# Patient Record
Sex: MALE | Race: BLACK OR AFRICAN AMERICAN | Employment: FULL TIME | URBAN - METROPOLITAN AREA
[De-identification: names, ages, dates, MRNs, and addresses within clinical notes are randomized per-mention and may not be internally consistent; named-entity substitution may affect disease eponyms.]

---

## 2020-07-11 ENCOUNTER — APPOINTMENT (OUTPATIENT)
Dept: CT IMAGING | Age: 40
End: 2020-07-11
Payer: COMMERCIAL

## 2020-07-11 ENCOUNTER — APPOINTMENT (OUTPATIENT)
Dept: GENERAL RADIOLOGY | Age: 40
End: 2020-07-11
Payer: COMMERCIAL

## 2020-07-11 ENCOUNTER — HOSPITAL ENCOUNTER (OUTPATIENT)
Age: 40
Setting detail: OBSERVATION
Discharge: HOME | End: 2020-07-14
Attending: EMERGENCY MEDICINE | Admitting: HOSPITALIST
Payer: COMMERCIAL

## 2020-07-11 PROBLEM — G81.94 HEMIPARESIS OF LEFT NONDOMINANT SIDE (HCC): Status: ACTIVE | Noted: 2020-07-11

## 2020-07-11 PROBLEM — G81.90: Status: ACTIVE | Noted: 2020-07-11

## 2020-07-11 LAB
A/G RATIO: 1.3 (ref 1.1–2.2)
ALBUMIN SERPL-MCNC: 4.4 G/DL (ref 3.4–5)
ALP BLD-CCNC: 46 U/L (ref 40–129)
ALT SERPL-CCNC: 20 U/L (ref 10–40)
AMPHETAMINE SCREEN, URINE: NORMAL
ANION GAP SERPL CALCULATED.3IONS-SCNC: 10 MMOL/L (ref 3–16)
AST SERPL-CCNC: 18 U/L (ref 15–37)
BARBITURATE SCREEN URINE: NORMAL
BASOPHILS ABSOLUTE: 0 K/UL (ref 0–0.2)
BASOPHILS RELATIVE PERCENT: 0.4 %
BENZODIAZEPINE SCREEN, URINE: NORMAL
BILIRUB SERPL-MCNC: 0.6 MG/DL (ref 0–1)
BUN BLDV-MCNC: 10 MG/DL (ref 7–20)
CALCIUM SERPL-MCNC: 10.3 MG/DL (ref 8.3–10.6)
CANNABINOID SCREEN URINE: NORMAL
CHLORIDE BLD-SCNC: 102 MMOL/L (ref 99–110)
CO2: 30 MMOL/L (ref 21–32)
COCAINE METABOLITE SCREEN URINE: NORMAL
CREAT SERPL-MCNC: 1.2 MG/DL (ref 0.9–1.3)
EOSINOPHILS ABSOLUTE: 0.1 K/UL (ref 0–0.6)
EOSINOPHILS RELATIVE PERCENT: 1.6 %
ETHANOL: NORMAL MG/DL (ref 0–0.08)
GFR AFRICAN AMERICAN: >60
GFR NON-AFRICAN AMERICAN: >60
GLOBULIN: 3.5 G/DL
GLUCOSE BLD-MCNC: 74 MG/DL (ref 70–99)
GLUCOSE BLD-MCNC: 76 MG/DL (ref 70–99)
HCT VFR BLD CALC: 50.3 % (ref 40.5–52.5)
HEMOGLOBIN: 17.5 G/DL (ref 13.5–17.5)
LYMPHOCYTES ABSOLUTE: 3.1 K/UL (ref 1–5.1)
LYMPHOCYTES RELATIVE PERCENT: 48.4 %
Lab: NORMAL
MCH RBC QN AUTO: 27 PG (ref 26–34)
MCHC RBC AUTO-ENTMCNC: 34.8 G/DL (ref 31–36)
MCV RBC AUTO: 77.7 FL (ref 80–100)
METHADONE SCREEN, URINE: NORMAL
MONOCYTES ABSOLUTE: 0.6 K/UL (ref 0–1.3)
MONOCYTES RELATIVE PERCENT: 9.7 %
NEUTROPHILS ABSOLUTE: 2.5 K/UL (ref 1.7–7.7)
NEUTROPHILS RELATIVE PERCENT: 39.9 %
OPIATE SCREEN URINE: NORMAL
OXYCODONE URINE: NORMAL
PDW BLD-RTO: 13.6 % (ref 12.4–15.4)
PERFORMED ON: NORMAL
PH UA: 6
PHENCYCLIDINE SCREEN URINE: NORMAL
PLATELET # BLD: 283 K/UL (ref 135–450)
PMV BLD AUTO: 8 FL (ref 5–10.5)
POTASSIUM SERPL-SCNC: 4.7 MMOL/L (ref 3.5–5.1)
PROPOXYPHENE SCREEN: NORMAL
RBC # BLD: 6.47 M/UL (ref 4.2–5.9)
SODIUM BLD-SCNC: 142 MMOL/L (ref 136–145)
TOTAL PROTEIN: 7.9 G/DL (ref 6.4–8.2)
TROPONIN: <0.01 NG/ML
TROPONIN: <0.01 NG/ML
WBC # BLD: 6.3 K/UL (ref 4–11)

## 2020-07-11 PROCEDURE — 36415 COLL VENOUS BLD VENIPUNCTURE: CPT

## 2020-07-11 PROCEDURE — 6370000000 HC RX 637 (ALT 250 FOR IP): Performed by: HOSPITALIST

## 2020-07-11 PROCEDURE — 2580000003 HC RX 258: Performed by: HOSPITALIST

## 2020-07-11 PROCEDURE — 80053 COMPREHEN METABOLIC PANEL: CPT

## 2020-07-11 PROCEDURE — G0480 DRUG TEST DEF 1-7 CLASSES: HCPCS

## 2020-07-11 PROCEDURE — 93005 ELECTROCARDIOGRAM TRACING: CPT | Performed by: NURSE PRACTITIONER

## 2020-07-11 PROCEDURE — 80307 DRUG TEST PRSMV CHEM ANLYZR: CPT

## 2020-07-11 PROCEDURE — G0378 HOSPITAL OBSERVATION PER HR: HCPCS

## 2020-07-11 PROCEDURE — 71045 X-RAY EXAM CHEST 1 VIEW: CPT

## 2020-07-11 PROCEDURE — 85025 COMPLETE CBC W/AUTO DIFF WBC: CPT

## 2020-07-11 PROCEDURE — 70450 CT HEAD/BRAIN W/O DYE: CPT

## 2020-07-11 PROCEDURE — 84484 ASSAY OF TROPONIN QUANT: CPT

## 2020-07-11 PROCEDURE — 99285 EMERGENCY DEPT VISIT HI MDM: CPT

## 2020-07-11 RX ORDER — IBUPROFEN 200 MG
400 TABLET ORAL EVERY 6 HOURS PRN
COMMUNITY

## 2020-07-11 RX ORDER — LABETALOL HYDROCHLORIDE 5 MG/ML
10 INJECTION, SOLUTION INTRAVENOUS EVERY 10 MIN PRN
Status: DISCONTINUED | OUTPATIENT
Start: 2020-07-11 | End: 2020-07-14 | Stop reason: HOSPADM

## 2020-07-11 RX ORDER — ONDANSETRON 2 MG/ML
4 INJECTION INTRAMUSCULAR; INTRAVENOUS EVERY 6 HOURS PRN
Status: DISCONTINUED | OUTPATIENT
Start: 2020-07-11 | End: 2020-07-14 | Stop reason: HOSPADM

## 2020-07-11 RX ORDER — ACETAMINOPHEN 325 MG/1
650 TABLET ORAL EVERY 4 HOURS PRN
Status: DISCONTINUED | OUTPATIENT
Start: 2020-07-11 | End: 2020-07-14 | Stop reason: HOSPADM

## 2020-07-11 RX ORDER — POLYETHYLENE GLYCOL 3350 17 G/17G
17 POWDER, FOR SOLUTION ORAL DAILY PRN
Status: DISCONTINUED | OUTPATIENT
Start: 2020-07-11 | End: 2020-07-14 | Stop reason: HOSPADM

## 2020-07-11 RX ORDER — ONDANSETRON 4 MG/1
4 TABLET, ORALLY DISINTEGRATING ORAL EVERY 8 HOURS PRN
Status: DISCONTINUED | OUTPATIENT
Start: 2020-07-11 | End: 2020-07-14 | Stop reason: HOSPADM

## 2020-07-11 RX ORDER — SODIUM CHLORIDE 0.9 % (FLUSH) 0.9 %
10 SYRINGE (ML) INJECTION PRN
Status: DISCONTINUED | OUTPATIENT
Start: 2020-07-11 | End: 2020-07-14 | Stop reason: HOSPADM

## 2020-07-11 RX ORDER — ASPIRIN 81 MG/1
81 TABLET ORAL DAILY
Status: DISCONTINUED | OUTPATIENT
Start: 2020-07-12 | End: 2020-07-14 | Stop reason: HOSPADM

## 2020-07-11 RX ORDER — ATORVASTATIN CALCIUM 80 MG/1
80 TABLET, FILM COATED ORAL NIGHTLY
Status: DISCONTINUED | OUTPATIENT
Start: 2020-07-11 | End: 2020-07-14 | Stop reason: HOSPADM

## 2020-07-11 RX ORDER — ACETAMINOPHEN 650 MG/1
650 SUPPOSITORY RECTAL EVERY 4 HOURS PRN
Status: DISCONTINUED | OUTPATIENT
Start: 2020-07-11 | End: 2020-07-14 | Stop reason: HOSPADM

## 2020-07-11 RX ORDER — SODIUM CHLORIDE 0.9 % (FLUSH) 0.9 %
10 SYRINGE (ML) INJECTION EVERY 12 HOURS SCHEDULED
Status: DISCONTINUED | OUTPATIENT
Start: 2020-07-11 | End: 2020-07-14 | Stop reason: HOSPADM

## 2020-07-11 RX ORDER — ASPIRIN 300 MG/1
300 SUPPOSITORY RECTAL DAILY
Status: DISCONTINUED | OUTPATIENT
Start: 2020-07-12 | End: 2020-07-14 | Stop reason: HOSPADM

## 2020-07-11 RX ADMIN — ATORVASTATIN CALCIUM 80 MG: 80 TABLET, FILM COATED ORAL at 22:59

## 2020-07-11 RX ADMIN — ASPIRIN 325 MG: 325 TABLET, COATED ORAL at 22:59

## 2020-07-11 RX ADMIN — Medication 10 ML: at 22:59

## 2020-07-11 SDOH — HEALTH STABILITY: MENTAL HEALTH: HOW OFTEN DO YOU HAVE A DRINK CONTAINING ALCOHOL?: NEVER

## 2020-07-11 ASSESSMENT — ENCOUNTER SYMPTOMS
CHEST TIGHTNESS: 0
DIARRHEA: 0
ABDOMINAL PAIN: 0
NAUSEA: 0
SHORTNESS OF BREATH: 0
VOMITING: 0

## 2020-07-11 ASSESSMENT — PAIN DESCRIPTION - LOCATION
LOCATION: SHOULDER
LOCATION_2: OTHER (COMMENT)
LOCATION: ARM;LEG
LOCATION: ARM

## 2020-07-11 ASSESSMENT — PAIN SCALES - GENERAL
PAINLEVEL_OUTOF10: 7
PAINLEVEL_OUTOF10: 6
PAINLEVEL_OUTOF10: 6
PAINLEVEL_OUTOF10: 7

## 2020-07-11 ASSESSMENT — PAIN DESCRIPTION - PAIN TYPE
TYPE: ACUTE PAIN
TYPE: ACUTE PAIN
TYPE_2: CHRONIC PAIN

## 2020-07-11 ASSESSMENT — PAIN DESCRIPTION - INTENSITY: RATING_2: 6

## 2020-07-11 ASSESSMENT — PAIN DESCRIPTION - ORIENTATION
ORIENTATION_2: RIGHT
ORIENTATION: LEFT
ORIENTATION: LEFT

## 2020-07-11 ASSESSMENT — PAIN DESCRIPTION - DESCRIPTORS
DESCRIPTORS_2: ACHING
DESCRIPTORS: HEAVINESS

## 2020-07-11 NOTE — LETTER
[facility] [place], [place] [address]  Phone: [phone]            July 14, 2020     Patient: Kye Posada   YOB: 1980   Date of Visit: 7/11/2020       To Whom It May Concern:    Kye Posada was hospitalized Saturday 7/11 and discharged Tuesday 7/14. He may return to full duty Wednesday 7/15 with no restrictions. If you have any questions or concerns, please don't hesitate to call.     Sincerely,        Lenin Uribe, CORNELIO-CNP

## 2020-07-11 NOTE — ED NOTES
Pt reports left arm stiffness to left arm shoulder feels like arm is stiff       Emily Oakley, RN  07/11/20 3025

## 2020-07-11 NOTE — ED NOTES
Pt verified white label on lab tubes was him, correct name and date of birth     Jen Begum RN  07/11/20

## 2020-07-11 NOTE — ED PROVIDER NOTES
[facility]        Pt Name: Precious Herrera  MRN: [id number]  [place] 1980  Date of evaluation: 7/11/2020  Provider: CORNELIO Mcmillan CNP  PCP: No primary care provider on file. I have seen and evaluated this patient with my supervising physician Leigh Kim       Chief Complaint   Patient presents with    Extremity Weakness     Pt to er with c/o numbness to left arm adn left leg for a week, states went to urgent care today for eval. pt denies injury to arm or leg, pt able to walk and move extremities with no difficulty. HISTORY OF PRESENT ILLNESS   (Location, Timing/Onset, Context/Setting, Quality, Duration, Modifying Factors, Severity, Associated Signs and Symptoms)  Note limiting factors. Precious Herrera is a 38 y.o. male presents to the emergency department complaining of paresthesia to left arm and left leg, states that they feel heavy. Reports mild left-sided headache. States that symptoms have been ongoing and constant for more than 1 week. He did go to urgent care today and was sent to the emergency department for formal evaluation. Denies chronic medical problems. Denies any fever, lightheadedness, dizziness, visual disturbances. No chest pain or pressure. No neck or back pain. No shortness of breath, cough, or congestion. No abdominal pain, nausea, vomiting, diarrhea, constipation, or dysuria. No rash. Nursing Notes were all reviewed and agreed with or any disagreements were addressed in the HPI. REVIEW OF SYSTEMS    (2-9 systems for level 4, 10 or more for level 5)     Review of Systems   Constitutional: Negative for activity change, chills and fever. Respiratory: Negative for chest tightness and shortness of breath. Cardiovascular: Negative for chest pain. Gastrointestinal: Negative for abdominal pain, diarrhea, nausea and vomiting.    Genitourinary: Negative for dysuria. Neurological: Positive for weakness and numbness. All other systems reviewed and are negative. Positives and Pertinent negatives as per HPI. Except as noted above in the ROS, all other systems were reviewed and negative. PAST MEDICAL HISTORY   No past medical history on file. SURGICAL HISTORY   No past surgical history on file. CURRENTMEDICATIONS       Previous Medications    No medications on file         ALLERGIES     Patient has no known allergies. FAMILYHISTORY     No family history on file. SOCIAL HISTORY       Social History     Tobacco Use    Smoking status: Never Smoker    Smokeless tobacco: Never Used   Substance Use Topics    Alcohol use: Never     Frequency: Never    Drug use: Not on file       SCREENINGS   NIH Stroke Scale  Interval: Baseline  Level of Consciousness (1a. ): Alert  LOC Questions (1b. ): Answers both correctly  LOC Commands (1c. ): Performs both tasks correctly  Best Gaze (2. ): Normal  Visual (3. ): No visual loss  Facial Palsy (4. ): Normal symmetrical movement  Motor Arm, Left (5a. ): Drift, but does not hit bed  Motor Arm, Right (5b. ): No drift  Motor Leg, Left (6a. ): Drift, but does not hit bed  Motor Leg, Right (6b. ): No drift  Limb Ataxia (7. ): Absent  Sensory (8. ): Normal(pt states if you rub the left arm he has decrease sensation on left arm but if you touch left arm sensation is equal)  Best Language (9. ): No aphasia  Dysarthria (10. ): Normal  Extinction and Inattention (11): No abnormality  Total: 2         PHYSICAL EXAM    (up to 7 for level 4, 8 or more for level 5)     ED Triage Vitals [07/11/20 1331]   BP Temp Temp Source Pulse Resp SpO2 Height Weight   108/76 98.2 °F (36.8 °C) Oral 79 17 99 % 5' 9\" (1.753 m) 175 lb (79.4 kg)       Physical Exam  Vitals signs and nursing note reviewed. Constitutional:       Appearance: He is well-developed. He is not diaphoretic.    HENT:      Head: Normocephalic and atraumatic. Right Ear: External ear normal.      Left Ear: External ear normal.      Mouth/Throat:      Pharynx: Oropharynx is clear. Eyes:      General:         Right eye: No discharge. Left eye: No discharge. Extraocular Movements: Extraocular movements intact. Conjunctiva/sclera: Conjunctivae normal.   Neck:      Musculoskeletal: Normal range of motion and neck supple. Vascular: No JVD. Cardiovascular:      Rate and Rhythm: Normal rate and regular rhythm. Pulses: Normal pulses. Heart sounds: Normal heart sounds. Pulmonary:      Effort: Pulmonary effort is normal. No respiratory distress. Breath sounds: Normal breath sounds. Abdominal:      Palpations: Abdomen is soft. Musculoskeletal: Normal range of motion. Skin:     General: Skin is warm and dry. Coloration: Skin is not pale. Neurological:      Mental Status: He is alert and oriented to person, place, and time. Cranial Nerves: Cranial nerves are intact. Sensory: Sensation is intact. Motor: Pronator drift present. No tremor. Coordination: Coordination is intact. Romberg sign negative. Coordination normal. Finger-Nose-Finger Test and Heel to [facility] Test normal.      Gait: Gait is intact. Comments: No paresthesia with exam    Slight pronator drift to upper and lower left extremities.     5/5 strength upper and lower extremities   Psychiatric:         Behavior: Behavior normal.         DIAGNOSTIC RESULTS   LABS:    Labs Reviewed   CBC WITH AUTO DIFFERENTIAL - Abnormal; Notable for the following components:       Result Value    RBC 6.47 (*)     MCV 77.7 (*)     All other components within normal limits    Narrative:     Performed at:  [facility] [address], [facility], [address]   Phone [phone]   COMPREHENSIVE METABOLIC PANEL    Narrative:     Performed at:  [facility] [address], [facility], [place] [address]   Phone [phone]   TROPONIN    Narrative:     Performed at:  [facility]  [address] [facility],  [place], [address]   Phone [phone]   URINE DRUG SCREEN    Narrative:     Performed at:  [facility]  [address] [facility],  [place], [address]   Phone [phone]   ETHANOL    Narrative:     Performed at:  [facility]  [address] [facility],  [place], [address]   Phone [phone]   POCT GLUCOSE    Narrative:     Performed at:  [facility]  [address] [facility],  [place], [address]   Phone [phone]       All other labs were within normal range or not returned as of this dictation. EKG: All EKG's are interpreted by the Emergency Department Physician in the absence of a cardiologist.  Please see their note for interpretation of EKG. RADIOLOGY:   Non-plain film images such as CT, Ultrasound and MRI are read by the radiologist. Plain radiographic images are visualized and preliminarily interpreted by the ED Provider with the below findings:        Interpretation per the Radiologist below, if available at the time of this note:    XR CHEST PORTABLE   Final Result   No acute cardiopulmonary pathology. CT Head WO Contrast   Final Result   No acute intracranial abnormality. No results found. PROCEDURES   Unless otherwise noted below, none     Procedures    CRITICAL CARE TIME   The total critical care time spent while evaluating and treating this patient was at least 31 minutes. This excludes time spent doing separately billable procedures. This includes time at the bedside, data interpretation, medication management, obtaining critical history from collateral sources if the patient is unable to provide it directly, and physician consultation.   Specifics of interventions taken and potentially life-threatening diagnostic considerations are listed above in the medical decision making. CONSULTS:  None      EMERGENCY DEPARTMENT COURSE and DIFFERENTIAL DIAGNOSIS/MDM:   Vitals:    Vitals:    07/11/20 1422 07/11/20 1500 07/11/20 1600 07/11/20 1621   BP: 139/83 132/87 (!) 149/84 136/83   Pulse: 75 79 80 80   Resp: 12 14 10 12   Temp:       TempSrc:       SpO2: 97% 98% 100%    Weight:       Height:           Patient was given the following medications:  Medications - No data to display        Briefly, this is a 38year old male that presents to the emergency department complaining of paresthesia to left arm and left leg, states that they feel heavy. Reports mild left-sided headache. States that symptoms have been ongoing and constant for more than 1 week. He did go to urgent care today and was sent to the emergency department for formal evaluation. Denies chronic medical problems. NIH stroke scale 2- upper and lower slight drift without touching bed. Symptoms have been ongoing for 1 week or more. Chest x-ray is unremarkable. Labs are unremarkable. CT head is unremarkable. CT Head WO Contrast (Final result)   Result time 07/11/20 15:55:08   Final result by Toya Fitzgerald MD (07/11/20 15:55:08)                 Impression:     No acute intracranial abnormality. Patient will admitted for MRI and stroke rule out. He does have a pronator drift of left arm and leg. He will be admitted to Dr. Felipe Torres, hospitalist.    FINAL IMPRESSION      1. Left sided numbness    2. Left-sided weakness          DISPOSITION/PLAN   DISPOSITION Decision To Admit 07/11/2020 05:23:14 PM      PATIENT REFERREDTO:  No follow-up provider specified.     DISCHARGE MEDICATIONS:  New Prescriptions    No medications on file       DISCONTINUED MEDICATIONS:  Discontinued Medications    No medications on file              (Please note that portions of this note were completed with a voice recognition program.  Efforts were made to edit the dictations but occasionally words are mis-transcribed.)    CORNELIO Ramirez - CNP (electronically signed)           CORNELIO Ramirez CNP  07/11/20 Sharon Rojas, CORNELIO Beth CNP  07/11/20 1403

## 2020-07-11 NOTE — ED NOTES
Bed: 03  Expected date:   Expected time:   Means of arrival:   Comments:  Bronson Serna RN  07/11/20 5171

## 2020-07-12 PROBLEM — R53.1 LEFT-SIDED WEAKNESS: Status: ACTIVE | Noted: 2020-07-11

## 2020-07-12 PROBLEM — R51.9 HEADACHE: Status: ACTIVE | Noted: 2020-07-12

## 2020-07-12 LAB
C-REACTIVE PROTEIN: 1 MG/L (ref 0–5.1)
CHOLESTEROL, TOTAL: 196 MG/DL (ref 0–199)
HCT VFR BLD CALC: 49.7 % (ref 40.5–52.5)
HDLC SERPL-MCNC: 32 MG/DL (ref 40–60)
HEMOGLOBIN: 17 G/DL (ref 13.5–17.5)
LDL CHOLESTEROL CALCULATED: 130 MG/DL
MCH RBC QN AUTO: 26.4 PG (ref 26–34)
MCHC RBC AUTO-ENTMCNC: 34.2 G/DL (ref 31–36)
MCV RBC AUTO: 77.2 FL (ref 80–100)
PDW BLD-RTO: 13.7 % (ref 12.4–15.4)
PLATELET # BLD: 293 K/UL (ref 135–450)
PMV BLD AUTO: 8.4 FL (ref 5–10.5)
RBC # BLD: 6.45 M/UL (ref 4.2–5.9)
SEDIMENTATION RATE, ERYTHROCYTE: 2 MM/HR (ref 0–15)
TRIGL SERPL-MCNC: 168 MG/DL (ref 0–150)
TROPONIN: <0.01 NG/ML
VLDLC SERPL CALC-MCNC: 34 MG/DL
WBC # BLD: 6 K/UL (ref 4–11)

## 2020-07-12 PROCEDURE — 86140 C-REACTIVE PROTEIN: CPT

## 2020-07-12 PROCEDURE — 86702 HIV-2 ANTIBODY: CPT

## 2020-07-12 PROCEDURE — 6370000000 HC RX 637 (ALT 250 FOR IP): Performed by: HOSPITALIST

## 2020-07-12 PROCEDURE — 86780 TREPONEMA PALLIDUM: CPT

## 2020-07-12 PROCEDURE — 83036 HEMOGLOBIN GLYCOSYLATED A1C: CPT

## 2020-07-12 PROCEDURE — 92523 SPEECH SOUND LANG COMPREHEN: CPT

## 2020-07-12 PROCEDURE — 94761 N-INVAS EAR/PLS OXIMETRY MLT: CPT

## 2020-07-12 PROCEDURE — 87390 HIV-1 AG IA: CPT

## 2020-07-12 PROCEDURE — 86701 HIV-1ANTIBODY: CPT

## 2020-07-12 PROCEDURE — G0378 HOSPITAL OBSERVATION PER HR: HCPCS

## 2020-07-12 PROCEDURE — 0065U SYFLS TST NONTREPONEMAL ANTB: CPT

## 2020-07-12 PROCEDURE — 85652 RBC SED RATE AUTOMATED: CPT

## 2020-07-12 PROCEDURE — 92610 EVALUATE SWALLOWING FUNCTION: CPT

## 2020-07-12 PROCEDURE — 85027 COMPLETE CBC AUTOMATED: CPT

## 2020-07-12 PROCEDURE — 80061 LIPID PANEL: CPT

## 2020-07-12 PROCEDURE — 2580000003 HC RX 258: Performed by: HOSPITALIST

## 2020-07-12 PROCEDURE — 96372 THER/PROPH/DIAG INJ SC/IM: CPT

## 2020-07-12 PROCEDURE — 84484 ASSAY OF TROPONIN QUANT: CPT

## 2020-07-12 PROCEDURE — 6360000002 HC RX W HCPCS: Performed by: HOSPITALIST

## 2020-07-12 PROCEDURE — 99219 PR INITIAL OBSERVATION CARE/DAY 50 MINUTES: CPT | Performed by: PSYCHIATRY & NEUROLOGY

## 2020-07-12 RX ADMIN — ENOXAPARIN SODIUM 40 MG: 40 INJECTION SUBCUTANEOUS at 11:03

## 2020-07-12 RX ADMIN — ATORVASTATIN CALCIUM 80 MG: 80 TABLET, FILM COATED ORAL at 20:52

## 2020-07-12 RX ADMIN — Medication 10 ML: at 20:53

## 2020-07-12 RX ADMIN — Medication 10 ML: at 11:03

## 2020-07-12 RX ADMIN — ASPIRIN 81 MG: 81 TABLET, COATED ORAL at 11:03

## 2020-07-12 ASSESSMENT — PAIN SCALES - GENERAL
PAINLEVEL_OUTOF10: 0

## 2020-07-12 NOTE — PROGRESS NOTES
consistency presentations    Dietary Recommendations:   Regular food consistencies  Thin liquids consistencies  Meds: as desired by pt    Strategies: 90 degree positioning with all p.o. intake; small bites/sips; alternate textures through meal; reduce rate of intake; No straws     Dysphagia Treatment/Goals: Speech therapy for dysphagia tx 3-5 times per week. ST.) Pt will tolerate regular food and thin liquid consistency diet without s/s of aspiration   2.) If clinical symptoms of penetration/aspiration continue to be noted,Pt will tolerate MBS to r/o aspiration and determine appropriate diet/liquid level. SPEECH LANGUAGE EVALUATION:  Speech Language Treatment Diagnosis: Eval per CVA work up    [place] Impressions:  1,Audible/intelligible connected speech with variable need for clarification 2/2 to pt with accent (English is pt's second language; Pt 's first language is a dialect of [place] [employer]    2. [place] language skills intact for auditory/visual language processing; and verbal expressive language skills (written expressive language was not assessed this session). Pt needed extra time and assist PRN for complex/abstract processing (unclear if due to english as second language regarding idioms etc)    3. Cognitive testing thus far revealed [facility] for temporal and spatial orientation; working memory and STM (for recall of 4 words after a 10 minute delay and distractions); VPS/reasoning and concrete math skills; Verbal reasoning and verbal judgement. Mild deficits in attention and constructional ability. · Pending results of MRI; additional testing of higher level executive function skills may be nec    Oral Motor exam/Motor Speech:   symmetrical moderate bilateral rom for labial rounding/protrusion/retraction and lingual protrusion/lateralization and elevation  Oral reflexes intact for palatal and gag  Oral Motor speech/voice intelligible.  Pt does have an accent (as documented above [place] is second language)  Volitional swallow: appeared timely  Volitional cough: strong/dry  Voice : unremarkable    Verbal Expression:   IND for biographical identifiers  18/18 CFN  IND expression of multiple word ideas for concept and event descriptions    Auditory Comprehension:   WFL for concrete multiple unit Y/N questions  WFL for spatial one step commands  WFL for 2 step (4-6 unit) commands  Extra time and intermittent assist with complex    Visual Language Processing:   [facility] for word level /phrase level and multiple unit sentence level    Cognitive-Linguistic:   NCSE  WFL for the following subtest:  · Orientation  · Language  · Memory  · Math Language  · Reasoning and Judgement  Mild score profile for NCSE subtests  · Attention  · Constructional ability    Plan: Speech therapy 3-5 times a weeks for speech-language treatment, and dysphagia treatment     Discharge Recommendations: Additional recommendations to be determined pending completion of diagnostic work up (MRI Brain; MRA Neck) and potential additional assessment of executive function      Speech Language Short-term goals: 1. Pt will demonstrate functional executive function skills    Patient/Family Education:Education, results and recommendations given to the Pt and nurse, who verbalized understanding    Timed Code Treatment: 0    Total Treatment Time: 50 minutes (ASHLEY and SLP evaluations)     If patient discharges prior to next session this note will serve as a discharge summary. Signed: Rickey Huffman,MS,CCC,SLP [id number]  Speech and Language Pathologist

## 2020-07-12 NOTE — PROGRESS NOTES
Brief progress note  Patient was admitted very early this morning by my colleague. . Still complains of mild left-sided weakness and numbness. ..  Stroke work-up is in progress including MRI/MRA and echocardiogram.. Neurology consulted

## 2020-07-12 NOTE — H&P
HOSPITALISTS HISTORY AND PHYSICAL    7/12/2020 2:00 AM    Patient Information:  Liliana Strange is a 38 y.o. male [id number]  PCP:  No primary care provider on file. (Tel: None )    Chief complaint:    Chief Complaint   Patient presents with    Extremity Weakness     Pt to er with c/o numbness to left arm adn left leg for a week, states went to urgent care today for eval. pt denies injury to arm or leg, pt able to walk and move extremities with no difficulty. History of Present Illness:  Amanda Geller is a 38 y.o. male who presented with to the ED to be evaluated for a one-week history of left-sided paresthesia and weakness. He states that the symptoms occurred insidiously and were accompanied by a headache which is been intermittent since that time. He denies fever, chills, LOC, dysarthria, or chest pain. He takes no prescription medications and states that he is a non-smoker, nondrinker. EKG, CT scan, and lab work-up revealed no acute cause for his symptoms. Physical exam revealed definite pronator drift LUE and L LE. Hospitalist team consulted to further investigate this patient's neurologic findings. History obtained from patient and epic chart        REVIEW OF SYSTEMS:   Constitutional: Negative for fever,chills or night sweats  ENT: Negative for rhinorrhea, epistaxis, hoarseness, sore throat. Respiratory: Negative for shortness of breath,wheezing  Cardiovascular: Negative for chest pain, palpitations   Gastrointestinal: Negative for nausea, vomiting, diarrhea  Genitourinary: Negative for polyuria, dysuria   Hematologic/Lymphatic: Negative for bleeding tendency, easy bruising  Musculoskeletal: Negative for myalgias and arthralgias  Neurologic: Positive headache and left-sided weakness  Skin: Negative for itching,rash  Psychiatric: Negative for depression,anxiety, agitation.   Endocrine: Negative for polydipsia,polyuria,heat /cold intolerance. Past Medical History:   has no past medical history on file. Past Surgical History:   has a past surgical history that includes [place] tooth extraction (Right, 2016). Medications:  No current facility-administered medications on file prior to encounter. Current Outpatient Medications on File Prior to Encounter   Medication Sig Dispense Refill    ibuprofen (ADVIL;MOTRIN) 200 MG tablet Take 400 mg by mouth every 6 hours as needed for Pain         Allergies:  No Known Allergies     Social History:  Patient Lives  reports that he has never smoked. He has never used smokeless tobacco. He reports that he does not drink alcohol or use drugs. Family History:  family history includes High Blood Pressure in his brother, paternal aunt, and paternal cousin; Stroke in his paternal cousin. Physical Exam:  /75   Pulse 73   Temp 97.7 °F (36.5 °C) (Axillary)   Resp 14   Ht 5' 9\" (1.753 m)   Wt 186 lb 1.6 oz (84.4 kg)   SpO2 98%   BMI 27.48 kg/m²     General appearance:  Appears comfortable. Well nourished  Eyes: Sclera clear, pupils equal  ENT: Moist mucus membranes, no thrush. Trachea midline. Cardiovascular: Regular rhythm, normal S1, S2. No murmur, gallop, rub. No edema in lower extremities  Respiratory: Clear to auscultation bilaterally, no wheeze, good inspiratory effort  Gastrointestinal: Abdomen soft, non-tender, not distended, normal bowel sounds  Musculoskeletal: No cyanosis in digits, neck supple  Neurology: Cranial nerves grossly intact. Alert and oriented in time, place and person. No speech deficits; Positive headache; left upper and left lower pronator drift; motor 5 out of 5 RUE and RLE but 4+ out of 5 LUE and LLE; no tremor; decrease pain sensation and two-point discrimination LLE  Psychiatry: Appropriate affect.  Not agitated  Skin: Warm, dry, normal turgor, no rash  Brisk capillary refill, peripheral pulses palpable Labs:  CBC:   Lab Results   Component Value Date    WBC 6.3 07/11/2020    RBC 6.47 07/11/2020    HGB 17.5 07/11/2020    HCT 50.3 07/11/2020    MCV 77.7 07/11/2020    MCH 27.0 07/11/2020    MCHC 34.8 07/11/2020    RDW 13.6 07/11/2020     07/11/2020    MPV 8.0 07/11/2020     BMP:    Lab Results   Component Value Date     07/11/2020    K 4.7 07/11/2020     07/11/2020    CO2 30 07/11/2020    BUN 10 07/11/2020    CREATININE 1.2 07/11/2020    CALCIUM 10.3 07/11/2020    GFRAA >60 07/11/2020    LABGLOM >60 07/11/2020    GLUCOSE 76 07/11/2020     XR CHEST PORTABLE   Final Result   No acute cardiopulmonary pathology. CT Head WO Contrast   Final Result   No acute intracranial abnormality. MRI brain with and without contrast    (Results Pending)   VL DUP CAROTID BILATERAL    (Results Pending)     Chest Xray:   EKG:    I visualized CXR images and EKG strips  Ventricular Rate  79 P BPM  QTc Calculation (Bazett)  419 P ms    Atrial Rate  79 P BPM  P Axis  50 P degrees    P-R Interval  188 P ms  R Axis  27 P degrees    QRS Duration  82 P ms  T Axis  13 P degrees    Q-T Interval  366 P ms  Diagnosis  Normal sinus rhythmNonspecific ST abnormalityAbnormal ECG P          Discussed case  with   Problem List  Principal Problem:    Hemiparesis of left nondominant side (HCC)  Active Problems:    Headache  Resolved Problems:    * No resolved hospital problems. *        Assessment/Plan:     1. Patient admitted to telemetry unit for close observation and serial neuro exams  2. ASA and statin dosed in case this represents RIND or CVA; neurology consult placed  3. MRI, MRA of head neck, carotid Dopplers, and echo scheduled for a.m. to further assess  4.   ESR, CRP, vitamin D, B12/folate, A1c pending        DVT prophylaxis- subcu Lovenox 40 mg daily  Code status-full code  Diet-swallow evaluation advance to cardiac diet as tolerated  IV access- PIV established in ED    Admit as observation I anticipate hospitalization spanning less than two midnights for investigation and treatment of the above medically necessary diagnoses. Please note that some part of this chart was generated using Dragon dictation software. Although every effort was made to ensure the accuracy of this automated transcription, some errors in transcription may have occurred inadvertently. If you may need any clarification, please do not hesitate to contact me through [facility].        Angelita Adrian MD    7/12/2020 2:00 AM

## 2020-07-12 NOTE — ED NOTES
Floor called, RN unable to take report d/t RN in a patient room, RN to call ER RN back for report RN asked to speak to charge nurse for ER RN to give report and 3T told RN 3T charge nurse had four patients that she has not seen herself,      Zahra Santiago RN  07/11/20 7915

## 2020-07-12 NOTE — ED PROVIDER NOTES
Jose's Emergency Department encounter, please see documentation by advanced practice provider Byron Woodall CNP.     Labs Reviewed   CBC WITH AUTO DIFFERENTIAL - Abnormal; Notable for the following components:       Result Value    RBC 6.47 (*)     MCV 77.7 (*)     All other components within normal limits    Narrative:     Performed at:  [facility]  [address],  [place], [address]   Phone [phone]   CBC - Abnormal; Notable for the following components:    RBC 6.45 (*)     MCV 77.2 (*)     All other components within normal limits    Narrative:     Performed at:  [facility] [address],  [place], [address]   Phone [phone]   COMPREHENSIVE METABOLIC PANEL    Narrative:     Performed at:  [facility] [address],  [place], [address]   Phone [phone]   TROPONIN    Narrative:     Performed at:  [facility] [address],  [place], [address]   Phone [phone]   URINE DRUG SCREEN    Narrative:     Performed at:  [facility] [address],  [place], [address]   Phone [phone]   ETHANOL    Narrative:     Performed at:  [facility] [address],  [place], [address]   Phone [phone]   TROPONIN    Narrative:     Performed at:  [facility] [address],  [place], [address]   Phone [phone]   TROPONIN    Narrative:     Performed at:  [facility] [address],  [place], [address]   Phone [phone]   SEDIMENTATION RATE    Narrative:     Performed at:  [facility]  [address],  [place], [address]   Phone [phone]   HEMOGLOBIN A1C   RPR TITER   HIV SCREEN   C-REACTIVE PROTEIN   LIPID PANEL   POCT GLUCOSE    Narrative:     Performed at:  [facility]

## 2020-07-12 NOTE — ED NOTES
Report given to sam made aware of drift on left arm and leg and tingling at time,      Jeanie Maher RN  07/11/20 0446

## 2020-07-12 NOTE — CONSULTS
In patient Neurology consult        [facility] Neurology      MD Genesis Russo  1980    Date of Service: 7/12/2020    Referring Physician: Tracie Martinez MD      Reason for the consult and CC: Acute left-sided weakness and paresthesia. HPI:   The patient is a 38y.o.  years old male without significant past medical history who was admitted last night to the hospital with acute left-sided weakness and numbness. Symptoms started few days ago. He describes sudden onset of left-sided numbness and tingling affecting his left leg and then his left arm. Degree was severe and persistent for the last few days. Other associated symptoms including intermittent left-sided weakness. No triggers or other associated symptoms. No headache, chest pain, dysphagia or dysarthria. No recent fever or falling. No prior history of stroke or recent change in medications. No other relieving or aggravating factors. Symptoms persisted and he decided to come to the ER for evaluation. Initial work-up with CT head showed no acute findings. Laboratory testing was unremarkable. He was admitted for medical management. Patient today denies any new symptoms. He continues to have numbness in the left side and mild left-sided weakness. Other review of system was unremarkable. Family History   Problem Relation Age of Onset    High Blood Pressure Brother     High Blood Pressure Paternal Aunt     High Blood Pressure Paternal Cousin     Stroke Paternal Cousin      Past Surgical History:   Procedure Laterality Date    WISDOM TOOTH EXTRACTION Right 2016        History reviewed. No pertinent past medical history.   Social History     Tobacco Use    Smoking status: Never Smoker    Smokeless tobacco: Never Used   Substance Use Topics    Alcohol use: Never     Frequency: Never    Drug use: Never     No Known Allergies  Current Facility-Administered Medications   Medication Dose Route Frequency Provider Last Rate Last Dose    sodium chloride flush 0.9 % injection 10 mL  10 mL Intravenous 2 times per day Teresa Sultana MD   10 mL at 07/11/20 2259    sodium chloride flush 0.9 % injection 10 mL  10 mL Intravenous PRN Teresa Sultana MD        polyethylene glycol (GLYCOLAX) packet 17 g  17 g Oral Daily PRN Melaniel Patisaúl, MD        enoxaparin (LOVENOX) injection 40 mg  40 mg Subcutaneous Daily Melaniel PatiMD saúl        aspirin EC tablet 81 mg  81 mg Oral Daily Melaniel Patience, MD Gary Browning aspirin suppository 300 mg  300 mg Rectal Daily Carmell Patience, MD        perflutren lipid microspheres (DEFINITY) injection 1.65 mg  1.5 mL Intravenous ONCE PRN Melaniel Patience, MD        acetaminophen (TYLENOL) tablet 650 mg  650 mg Oral Q4H PRN Melaniel Patience, MD Gary Browning acetaminophen (TYLENOL) suppository 650 mg  650 mg Rectal Q4H PRN Carmingol Patience, MD        bisacodyl (DULCOLAX) EC tablet 5 mg  5 mg Oral Daily PRN Carmell Patience, MD        ondansetron (ZOFRAN-ODT) disintegrating tablet 4 mg  4 mg Oral Q8H PRN Carmell Patisaúl, MD        Or    ondansetron [facility]) injection 4 mg  4 mg Intravenous Q6H PRN Carmell Patience, MD        atorvastatin (LIPITOR) tablet 80 mg  80 mg Oral Nightly Teresa Sultana MD   80 mg at 07/11/20 2259    labetalol (NORMODYNE;TRANDATE) injection 10 mg  10 mg Intravenous Q10 Min PRN Teresa Sultana MD           ROS : A 10-14 system review of constitutional, cardiovascular, respiratory, eyes, musculoskeletal, endocrine, GI, ENT, skin, hematological, genitourinary, psychiatric and neurologic systems was obtained and updated today and is unremarkable except as mentioned in my HPI      Exam:     Constitutional:   Vitals:    07/11/20 2220 07/12/20 0037 07/12/20 0206 07/12/20 0841   BP: 126/75  119/72 114/79   Pulse: 73  73 78   Resp: 14  14 16   Temp: 97.7 °F (36.5 °C)  97.6 °F (36.4 °C) 97.9 °F (36.6 °C)   TempSrc: Axillary  Oral Axillary   SpO2: 98% 98% 98% 95% Weight: 186 lb 1.6 oz (84.4 kg)      Height: 5' 9\" (1.753 m)          General appearance:  Normal development and appear in no acute distress. Eye: No icterus. Fundus: Funduscopic examination cannot be performed due to COVID19 restrictions and precautions. Neck: supple  Cardiovascular: No lower leg edema with good pulsation. Mental Status:   Oriented to person, place, problem, and time. Memory: Aware of recent and remote event. Good immediate recall. Intact remote memory  Normal attention span and concentration. Language: intact naming, repeating and fluency   Good fund of Knowledge. Aware of current events and vocabulary   Cranial Nerves:   II: Visual fields: Full. Pupils: equal, round, reactive to light  III,IV,VI: Extra Ocular Movements are intact. No nystagmus  V: Facial sensation is intact   VII: Facial strength and movements: intact and symmetric  VIII: Hearing: Intact to finger rub bilaterally  IX: Palate elevation is symmetric  XI: Shoulder shrug is intact  XII: Tongue movements are normal  Musculoskeletal: 5/5 in the right side. Mild left-sided weakness 4/5. Tone: Normal tone. Reflexes: Bilateral biceps 2/4, triceps 2/4, brachial radialis 2/4, knee 2/4 and ankle 2/4. Planters: flexor bilaterally. Coordination: no pronator drift, no dysmetria with FNF in upper extremities. Normal REM. Sensation: normal to all modalities in both arms and legs. Subjective numbness in the left arm and leg in a nondermatomal pattern  Gait/Posture: Not tested due to weakness.     Data:  LABS:   Lab Results   Component Value Date     07/11/2020    K 4.7 07/11/2020     07/11/2020    CO2 30 07/11/2020    BUN 10 07/11/2020    CREATININE 1.2 07/11/2020    GFRAA >60 07/11/2020    LABGLOM >60 07/11/2020    GLUCOSE 76 07/11/2020    CALCIUM 10.3 07/11/2020     Lab Results   Component Value Date    WBC 6.0 07/12/2020    RBC 6.45 07/12/2020    HGB 17.0 07/12/2020    HCT 49.7 07/12/2020    MCV 77.2 07/12/2020    RDW 13.7 07/12/2020     07/12/2020   No results found for: INR, PROTIME    Neuroimaging were independently reviewed by myself and discussed results with the patient  Reviewed notes from different physicians  Reviewed lab and blood testing    Impression:  Acute left-sided weakness and paresthesia, severe. So far no specific etiology. Rule out new ischemic stroke and less likely demyelination/MS or migraine variant. Less likely radiculopathy. Recommendation:  MRI of the brain  MRA head and neck was ordered  Telemetry  PT and OT  Start aspirin  DVT and GI prophylaxis  Echo  A1c and lipid panel  Blood pressure monitor for now  Stroke prevention was discussed with the patient  Blood sugar monitor for now  We will follow after the above recommendation. Thank you for referring such patient. If you have any questions regarding my consult note, please don't hesitate to call me. Amina Alvarado MD  [phone]    This dictation was generated by voice recognition computer software.  Although all attempts are made to edit the dictation for accuracy, there may be errors in the  transcription that are not intended

## 2020-07-13 ENCOUNTER — APPOINTMENT (OUTPATIENT)
Dept: MRI IMAGING | Age: 40
End: 2020-07-13
Payer: COMMERCIAL

## 2020-07-13 LAB
ESTIMATED AVERAGE GLUCOSE: 96.8 MG/DL
HBA1C MFR BLD: 5 %
HIV AG/AB: NORMAL
HIV ANTIGEN: NORMAL
HIV-1 ANTIBODY: NORMAL
HIV-2 AB: NORMAL
LEFT VENTRICULAR EJECTION FRACTION HIGH VALUE: 55 %
LEFT VENTRICULAR EJECTION FRACTION MODE: NORMAL
LV EF: 55 %
LVEF MODALITY: NORMAL
TOTAL SYPHILLIS IGG/IGM: NORMAL

## 2020-07-13 PROCEDURE — 96372 THER/PROPH/DIAG INJ SC/IM: CPT

## 2020-07-13 PROCEDURE — 94761 N-INVAS EAR/PLS OXIMETRY MLT: CPT

## 2020-07-13 PROCEDURE — 2580000003 HC RX 258: Performed by: HOSPITALIST

## 2020-07-13 PROCEDURE — 93306 TTE W/DOPPLER COMPLETE: CPT

## 2020-07-13 PROCEDURE — 92526 ORAL FUNCTION THERAPY: CPT

## 2020-07-13 PROCEDURE — 97129 THER IVNTJ 1ST 15 MIN: CPT

## 2020-07-13 PROCEDURE — 2580000003 HC RX 258: Performed by: PSYCHIATRY & NEUROLOGY

## 2020-07-13 PROCEDURE — 6360000002 HC RX W HCPCS: Performed by: HOSPITALIST

## 2020-07-13 PROCEDURE — 6370000000 HC RX 637 (ALT 250 FOR IP): Performed by: HOSPITALIST

## 2020-07-13 PROCEDURE — 70549 MR ANGIOGRAPH NECK W/O&W/DYE: CPT

## 2020-07-13 PROCEDURE — A9577 INJ MULTIHANCE: HCPCS | Performed by: PSYCHIATRY & NEUROLOGY

## 2020-07-13 PROCEDURE — 93971 EXTREMITY STUDY: CPT

## 2020-07-13 PROCEDURE — G0378 HOSPITAL OBSERVATION PER HR: HCPCS

## 2020-07-13 PROCEDURE — 6360000004 HC RX CONTRAST MEDICATION: Performed by: PSYCHIATRY & NEUROLOGY

## 2020-07-13 PROCEDURE — 97165 OT EVAL LOW COMPLEX 30 MIN: CPT

## 2020-07-13 PROCEDURE — 99226 PR SBSQ OBSERVATION CARE/DAY 35 MINUTES: CPT | Performed by: PSYCHIATRY & NEUROLOGY

## 2020-07-13 PROCEDURE — 70544 MR ANGIOGRAPHY HEAD W/O DYE: CPT

## 2020-07-13 PROCEDURE — 70553 MRI BRAIN STEM W/O & W/DYE: CPT

## 2020-07-13 RX ORDER — SODIUM CHLORIDE 9 MG/ML
INJECTION, SOLUTION INTRAVENOUS ONCE
Status: COMPLETED | OUTPATIENT
Start: 2020-07-13 | End: 2020-07-13

## 2020-07-13 RX ADMIN — SODIUM CHLORIDE: 9 INJECTION, SOLUTION INTRAVENOUS at 15:30

## 2020-07-13 RX ADMIN — Medication 10 ML: at 21:28

## 2020-07-13 RX ADMIN — ASPIRIN 81 MG: 81 TABLET, COATED ORAL at 13:46

## 2020-07-13 RX ADMIN — ENOXAPARIN SODIUM 40 MG: 40 INJECTION SUBCUTANEOUS at 13:45

## 2020-07-13 RX ADMIN — GADOBENATE DIMEGLUMINE 17 ML: 529 INJECTION, SOLUTION INTRAVENOUS at 15:30

## 2020-07-13 RX ADMIN — ATORVASTATIN CALCIUM 80 MG: 80 TABLET, FILM COATED ORAL at 21:28

## 2020-07-13 ASSESSMENT — PAIN SCALES - GENERAL
PAINLEVEL_OUTOF10: 0

## 2020-07-13 NOTE — PROGRESS NOTES
Speech  Language And Dysphagia Treatment Note/Discharge    Name: Walter Bravo  : 1980  Medical Diagnosis: Hemiparesis of nondominant side ([facility]) [G81.90]  Hemiparesis of nondominant side ([facility] [address] [G81.90]  Treatment Diagnosis: oropharyngeal, possible cognitive-linguistic deficits  Pain: denied     Patient's response to therapy:  Pt awake, alert and more verbally responsive this date. Dysphagia Treatment:  Current Diet Level: Regular food consistencies; Thin liquids consistencies; Meds: as desired by pt  Tolerance of Current Diet Level: Per RN and pt, no difficulty with current diet    Assessment of Texture Tolerance:  -Impressions: Pt seen sitting upright in bed, alert and cooperative. Pt ate snack of regular/dry texture ananya crackers and drank thin liquids via cup. Pt demonstrated functional mastication with ananya crackers within appropriate time frame. Adequate bolus formation and transport were noted. Pt demonstrated good bolus manipulation, and no oral residue was present. Thin liquids revealed good bolus control and oral containment with both single and sequential sips. Swallow initiation felt timely and adequate laryngeal elevation was felt upon manual palpation. No s/s aspiration during session. At this time, pt's swallow function appears WNL. Recommend continue current diet and will d/c from dysphagia services.     -Compensatory strategies:  90 degree positioning with all p.o. intake; small bites/sips; alternate textures through meal; reduce rate of intake; Dysphagia Goals, addressed this date:  1.) Pt will tolerate regular food and thin liquid consistency diet without s/s of aspiration (GOAL MET, 2020)  2.) If clinical symptoms of penetration/aspiration continue to be noted,Pt will tolerate MBS to r/o aspiration and determine appropriate diet/liquid level.  (d/c goal, not indicated, 2020)    Diet and Treatment Recommendations:  Continue regular texture diet/thin liquids; meds as tolerated    Speech Language Treatment:  Impressions: Pt seen sitting upright in bed, alert and cooperative. Pt reports no changes in cognitive skills. Pt is oriented and demonstrates functional expressive/receptive language skills. Pt appears to demonstrate adequate cognitive-linguistic skills at this time. Speech Language STG, addressed this date: 1. Pt will demonstrate functional executive function skills   -clock drawing: orientation of numbers slightly off; wrote all 12 numbers, leslie in correct time   -problem solving/mental flexibility: pt provided multiple solutions to given scenario with 80% accuracy; increased to 90% given minimal verbal cues. -orientation: pt oriented x4 independently    Patient/Family Education:Education given to the Pt and nurse, who verbalized understanding    Assessment: Patient progressing toward goals    Plan: Continue as per plan of care    Discharge Recommendations: Pt does not require additional skilled Speech Therapy for Speech/Dysphagia at this time. Treatment time  Timed Code Treatment Minutes: 14 minutes  Total Treatment time: 24 minutes    If patient discharges prior to next session this note will serve as a discharge summary. Signature:    KASSI Marina.S. [id number] [facility]  Speech-Language Pathologist

## 2020-07-13 NOTE — PROGRESS NOTES
VSS, assessment as charted. Sensation is now equal right compared to left, but still has intermittent paresthesia on LLE with improved but still detectable ataxia and drift LLE. Med administered, dinner tray reheated per patient request.  Visitor @bedside, denies other needs @present.

## 2020-07-13 NOTE — PLAN OF CARE
Problem: HEMODYNAMIC STATUS  Goal: Patient has stable vital signs and fluid balance  Outcome: Ongoing  Note: VSS this shift, remains SR on telemetry. Problem: ACTIVITY INTOLERANCE/IMPAIRED MOBILITY  Goal: Mobility/activity is maintained at optimum level for patient  Outcome: Ongoing  Note: Still has slight ataxia and intermittent tingling in left leg, sensation otherwise normal.     Problem: COMMUNICATION IMPAIRMENT  Goal: Ability to express needs and understand communication  Outcome: Ongoing  Note: No verbal impairment noted this shift.

## 2020-07-13 NOTE — PROGRESS NOTES
Occupational Therapy   Occupational Therapy Initial Assessment  Date: 2020   Patient Name: Kel Newberry  MRN: [id number]     : 1980    Date of Service: 2020    Discharge Recommendations:Usman Garcia scored a 24/24 on the -[facility] ADL Inpatient form. At this time, no further OT is recommended upon discharge due to patient independent and safe with ADLS. Recommend patient returns to prior setting with prior services. OT Equipment Recommendations  Equipment Needed: No    Assessment   Assessment: Patient presents close to baseline. I with ADLs, strength normal, sensation in UE normal.  No skilled OT indicated. Treatment Diagnosis: TIA  OT Education: OT Role;Plan of Care  Patient Education: Patient Independent with education  REQUIRES OT FOLLOW UP: No  Activity Tolerance  Activity Tolerance: Patient Tolerated treatment well  Safety Devices  Safety Devices in place: Not Applicable(Left with transporter for echo, seated in chair.)           Patient Diagnosis(es): The primary encounter diagnosis was Left sided numbness. A diagnosis of Left-sided weakness was also pertinent to this visit. has no past medical history on file. has a past surgical history that includes [place] tooth extraction (Right, ). Treatment Diagnosis: TIA      Restrictions  Restrictions/Precautions  Restrictions/Precautions: Fall Risk, Modified Diet(Low fall risk, cardiac diet)  Position Activity Restriction  Other position/activity restrictions: Kel Newberry is a 38 y.o. male presents to the emergency department complaining of paresthesia to left arm and left leg, states that they feel heavy. Reports mild left-sided headache. States that symptoms have been ongoing and constant for more than 1 week. He did go to urgent care today and was sent to the emergency department for formal evaluation.     Subjective   General  Chart Reviewed: Yes  Family / Caregiver Present: No  Diagnosis: TIA  Subjective  Subjective: In bed, agreeable to therapy  Patient Currently in Pain: Denies  O2 Device: None (Room air)  Social/Functional History  Social/Functional History  Lives With: Alone  Type of Home: Apartment  Home Layout: One level  Home Access: Stairs to enter with rails  Entrance Stairs - Number of Steps: 1 JESIKA  Bathroom Shower/Tub: Tub/Shower unit  Home Equipment: (None)  ADL Assistance: Independent  Homemaking Assistance: Independent  Ambulation Assistance: Independent  Transfer Assistance: Independent  Active : Yes  Type of occupation:   Leisure & Hobbies: gym  Additional Comments: No recent falls       Objective        Orientation  Overall Orientation Status: Within Normal Limits     Balance  Sitting Balance: Independent  Standing Balance: Independent  Functional Mobility  Functional - Mobility Device: No device  Activity: To/From therapy gym  Assist Level: Independent  ADL  LE Dressing: Independent  Tone RUE  RUE Tone: Normotonic  Tone LUE  LUE Tone: Normotonic  Coordination  Movements Are Fluid And Coordinated: Yes     Bed mobility  Supine to Sit: Independent  Sit to Supine: Independent  Transfers  Stand Step Transfers: Independent  Sit to stand: Independent  Stand to sit: Independent  Vision - Basic Assessment  Prior Vision: No visual deficits  Visual History: No significant visual history  Visual Field Cut: No  Oculo Motor Control: WNL  Cognition  Overall Cognitive Status: WNL        Sensation  Overall Sensation Status: Impaired(UE sensation WFL)        LUE PROM (degrees)  LUE PROM: WNL  LUE AROM (degrees)  LUE AROM : WNL  RUE PROM (degrees)  RUE PROM: WNL  RUE AROM (degrees)  RUE AROM : WNL  LUE Strength  Gross LUE Strength: WNL  RUE Strength  Gross RUE Strength:  WNL                   Plan   Plan  Times per week: eval and d/c      AM-PAC Score        AM-PAC Inpatient Daily Activity Raw Score: 24 (07/13/20 0934)  AM-PAC Inpatient ADL T-Scale Score : 57.54 (07/13/20 0934)  ADL Inpatient CMS 0-100%

## 2020-07-13 NOTE — PROGRESS NOTES
NEUROLOGY FOLLOWUP    HISTORY OF PRESENT ILLNESS :   This is my first encounter with this patient. Patient was seen by Dr. Cathy Yancey yesterday. The chart was reviewed in detail. Kellee Titus is a 38 y.o. male   History was obtained from the patient and dictations in the chart. Patient states that for the last 1 week he has had episodes of left-sided numbness associated with a headache. The numbness would start in the leg and move up towards the arm. It slowly spread to the entire left side. She has occasional mild subjective weakness. No vertigo or diplopia. Patient has had on and off headaches for a number of years. The headaches get worse with loud noise. He denies any visual symptoms with the headaches. REVIEW OF SYSTEMS       Constitutional:  []   Chills   []  Fatigue   []  Fevers   []  Malaise   []  Weight loss     [x] Denies all of the above    Respiratory:   []  Cough    []  Shortness of breath         [x] Denies all of the above     Cardiovascular:   []  Chest pain    []  Exertional chest pressure/discomfort           [] Palpitations    []  Syncope     [x] Denies all of the above    History reviewed. No pertinent past medical history.   Family History   Problem Relation Age of Onset    High Blood Pressure Brother     High Blood Pressure Paternal Aunt     High Blood Pressure Paternal Cousin     Stroke Paternal Cousin      Social History     Socioeconomic History    Marital status:      Spouse name: Yunior Nicolas Number of children: 0    Years of education: None    Highest education level: None   Occupational History    None   Social Needs    Financial resource strain: None    Food insecurity     Worry: None     Inability: None    Transportation needs     Medical: None     Non-medical: None   Tobacco Use    Smoking status: Never Smoker    Smokeless tobacco: Never Used   Substance and Sexual Activity  Alcohol use: Never     Frequency: Never    Drug use: Never    Sexual activity: Never   Lifestyle    Physical activity     Days per week: None     Minutes per session: None    Stress: None   Relationships    Social connections     Talks on phone: None     Gets together: None     Attends [employer] witness service: None     Active member of club or organization: None     Attends meetings of clubs or organizations: None     Relationship status: None    Intimate partner violence     Fear of current or ex partner: None     Emotionally abused: None     Physically abused: None     Forced sexual activity: None   Other Topics Concern    None   Social History Narrative    None        PHYSICAL EXAMINATION      /68   Pulse 81   Temp 97.2 °F (36.2 °C) (Oral)   Resp 16   Ht 5' 9\" (1.753 m)   Wt 186 lb 1.6 oz (84.4 kg)   SpO2 97%   BMI 27.48 kg/m²   This is a well-nourished patient in no acute distress  Patient is awake, alert and oriented x3. Speech is normal.  Pupils are equal round reacting to light. Extraocular movements intact. Face symmetrical. Tongue midline. Motor exam shows normal symmetrical strength. Deep tendon reflexes normal. Plantar reflexes downgoing. Sensory exam normal. Coordination normal. Gait normal. No carotid bruit. No neck stiffness.     DATA :  LABS:  General Labs:    CBC:   Lab Results   Component Value Date    WBC 6.0 07/12/2020    RBC 6.45 07/12/2020    HGB 17.0 07/12/2020    HCT 49.7 07/12/2020    MCV 77.2 07/12/2020    MCH 26.4 07/12/2020    MCHC 34.2 07/12/2020    RDW 13.7 07/12/2020     07/12/2020    MPV 8.4 07/12/2020     BMP:    Lab Results   Component Value Date     07/11/2020    K 4.7 07/11/2020     07/11/2020    CO2 30 07/11/2020    BUN 10 07/11/2020    LABALBU 4.4 07/11/2020    CREATININE 1.2 07/11/2020    CALCIUM 10.3 07/11/2020    GFRAA >60 07/11/2020    LABGLOM >60 07/11/2020    GLUCOSE 76 07/11/2020     RADIOLOGY REVIEW:  I have reviewed radiology images of MRI brain. Official report is pending. IMPRESSION :  TIA  This is probably a migraine variant with vasospasm  Chronic history of headaches which sound like migraines  MRI brain images were reviewed and there is no evidence of acute stroke. Official radiology report is pending. MRA results are pending. Patient Active Problem List   Diagnosis    Left-sided weakness    Headache    Left sided numbness    TIA involving right internal carotid artery       RECOMMENDATIONS :  Discussed with patient  Discussed with patient's nurse  Aspirin 81 mg daily  Await official report of MRI and MRA brain. If these are normal hopefully patient can be discharged home later today. He needs to follow-up with primary care physician. Time spent in the care of this patient including discussions with patient as well as reviewing radiology images was 35 minutes. Please note a portion of this chart was generated using dragon dictation software. Although every effort was made to ensure the accuracy of this automated transcription, some errors in transcription may have occurred.          Bessie Payne M.D.

## 2020-07-13 NOTE — PROGRESS NOTES
[facility]    7/13/2020 11:31 AM        Name: Walter Bravo . Admitted: 7/11/2020  Primary Care Provider: No primary care provider on file. (Tel: None)      Subjective:  Patient is a 39 yo male with no significant PMH. He presented to hospital with 1 week hx of left sided paresthesia and weakness. Head CT with no acute abnormality. Resting in bed. Reports improvement in left sided weakness. Echo demonstrates normal EF, no evidence of shunt. MRI/MRA pending. Denies chest pain, shortness of breath. Reviewed interval ancillary notes    Current Medications  sodium chloride flush 0.9 % injection 10 mL, 2 times per day  sodium chloride flush 0.9 % injection 10 mL, PRN  polyethylene glycol (GLYCOLAX) packet 17 g, Daily PRN  enoxaparin (LOVENOX) injection 40 mg, Daily  aspirin EC tablet 81 mg, Daily    Or  aspirin suppository 300 mg, Daily  perflutren lipid microspheres (DEFINITY) injection 1.65 mg, ONCE PRN  acetaminophen (TYLENOL) tablet 650 mg, Q4H PRN    Or  acetaminophen (TYLENOL) suppository 650 mg, Q4H PRN  bisacodyl (DULCOLAX) EC tablet 5 mg, Daily PRN  ondansetron (ZOFRAN-ODT) disintegrating tablet 4 mg, Q8H PRN    Or  ondansetron (ZOFRAN) injection 4 mg, Q6H PRN  atorvastatin (LIPITOR) tablet 80 mg, Nightly  labetalol (NORMODYNE;TRANDATE) injection 10 mg, Q10 Min PRN        Objective:  /77   Pulse 87   Temp 97.5 °F (36.4 °C) (Oral)   Resp 16   Ht 5' 9\" (1.753 m)   Wt 186 lb 1.6 oz (84.4 kg)   SpO2 95%   BMI 27.48 kg/m²     Intake/Output Summary (Last 24 hours) at 7/13/2020 1131  Last data filed at 7/12/2020 1702  Gross per 24 hour   Intake 240 ml   Output --   Net 240 ml      Wt Readings from Last 3 Encounters:   07/11/20 186 lb 1.6 oz (84.4 kg)       General appearance:  Appears comfortable  Eyes: Sclera clear. Pupils equal.  ENT: Moist oral mucosa.  Trachea midline, no adenopathy. Cardiovascular: Regular rhythm, normal S1, S2. No murmur. No edema in lower extremities  Respiratory: Not using accessory muscles. Good inspiratory effort. Clear to auscultation bilaterally, no wheeze or crackles. GI: Abdomen soft, no tenderness, not distended, normal bowel sounds  Musculoskeletal: No cyanosis in digits, neck supple  Neurology: CN 2-12 grossly intact. No speech deficits, mild left sided weakness  Psych: Normal affect. Alert and oriented in time, place and person  Skin: Warm, dry, normal turgor    Labs and Tests:  CBC:   Recent Labs     07/11/20  1452 07/12/20  0251   WBC 6.3 6.0   HGB 17.5 17.0    293     BMP:    Recent Labs     07/11/20  1452      K 4.7      CO2 30   BUN 10   CREATININE 1.2   GLUCOSE 76     Hepatic:   Recent Labs     07/11/20  1452   AST 18   ALT 20   BILITOT 0.6   ALKPHOS 46     CXR 7/11/2020:  FINDINGS: Single portable frontal view of the chest is submitted for review. The cardiac silhouette is normal in size. Lung parenchyma is clear without focal airspace consolidation, sizeable pleural effusion, or pneumothorax. Trachea is midline. Visualized osseous structures and soft tissues are grossly intact. CT Head 7/11/2020:  FINDINGS: BRAIN/VENTRICLES: There is no acute intracranial hemorrhage, mass effect or midline shift. No abnormal extra-axial fluid collection. The gray-white differentiation is maintained without evidence of an acute infarct. There is no evidence of hydrocephalus. ORBITS: The visualized portion of the orbits demonstrate no acute abnormality. SINUSES: The visualized paranasal sinuses and mastoid air cells demonstrate no acute abnormality. SOFT TISSUES/SKULL:  No acute abnormality of the visualized skull or soft tissues. Echo 7/11/2020:  Summary   -Normal left ventricle size, wall thickness and systolic function with an   estimated ejection fraction of 55%.   -No regional wall motion abnormalities are seen.    -E/e\"= 7. 6 .   -Normal diastolic function.   -There appears to be chordal CUCO with no gradients. This would be of no   clinical consequence   -A bubble study was performed and fails to show evidence of shunting.   -Trivial tricuspid regurgitation. Problem List  Principal Problem:    Left-sided weakness  Active Problems:    Headache    Left sided numbness    TIA involving right internal carotid artery  Resolved Problems:    * No resolved hospital problems. *       Assessment & Plan:   1. Left sided weakness and paresthesia. Workup thus far unrevealing. Patient reports symptomatic improvement. MRI/MRA pending. Continue asa and statin. 2. HA. Resolved. CRP and sed rate WNL. Diet: DIET CARDIAC; No Added Salt (3-4 GM)  Code:Full Code  DVT PPX: on enoxaparin      CORNELIO Adam CNP   7/13/2020 11:31 AM     Addendum 7/13/20 1:50 pm: Nursing reports pain/tenderness LLE. No edema or redness noted on exam earlier. Will order doppler to assess for DVT.    DAPHNIE Ha

## 2020-07-13 NOTE — PROGRESS NOTES
Physical Therapy    Facility/Department: [address]  Initial Assessment/Discharge Summary    NAME: Theresa Seip  : 1980  MRN: [id number]    Date of Service: 2020    Discharge Recommendations: Theresa Seip scored a 24/24 on the AM-PAC short mobility form. At this time, no further PT is recommended upon discharge due to pt being at baseline independent functional mobility. Recommend patient returns to prior setting with prior services. PT Equipment Recommendations  Equipment Needed: No    Assessment   Assessment: Pt is presenting at baseline functional mobility and does not require skilled PT services at this time. As such, pt is being discharged from acute PT caseload. Please re-order should status change. Prognosis: Excellent  Decision Making: Low Complexity  Clinical Presentation: stable  PT Education: PT Role;General Safety  Patient Education: d/c recommendations--pt verbalizing understanding  Barriers to Learning: none  No Skilled PT: Independent with functional mobility   REQUIRES PT FOLLOW UP: No  Activity Tolerance  Activity Tolerance: Patient Tolerated treatment well       Patient Diagnosis(es): The primary encounter diagnosis was Left sided numbness. A diagnosis of Left-sided weakness was also pertinent to this visit. has no past medical history on file. has a past surgical history that includes [place] tooth extraction (Right, 2016). Restrictions  Restrictions/Precautions  Restrictions/Precautions: Fall Risk, Modified Diet(Low fall risk, cardiac diet)  Position Activity Restriction  Other position/activity restrictions: Theresa Seip is a 38 y.o. male presents to the emergency department complaining of paresthesia to left arm and left leg, states that they feel heavy. Reports mild left-sided headache. States that symptoms have been ongoing and constant for more than 1 week.   He did go to urgent care today and was sent to the emergency department for formal evaluation. Vision/Hearing  Vision: Within Functional Limits  Hearing: Within functional limits       Subjective  General  Chart Reviewed: Yes  Response To Previous Treatment: Not applicable  Family / Caregiver Present: No  Diagnosis: L hemiparesis  Follows Commands: Within Functional Limits  General Comment  Comments: Pt supine in bed upon arrival.  Subjective  Subjective: Pt reporting no pain at this time. States some tingling noted in LLE and L side intermittently feels heavy.   Pain Screening  Patient Currently in Pain: Denies  Vital Signs  Patient Currently in Pain: Denies       Orientation  Orientation  Overall Orientation Status: Within Functional Limits     Social/Functional History  Social/Functional History  Lives With: Alone  Type of Home: Apartment  Home Layout: One level  Home Access: Stairs to enter with rails  Entrance Stairs - Number of Steps: 1 JESIKA  Bathroom Shower/Tub: Tub/Shower unit  Home Equipment: (None)  ADL Assistance: Independent  Homemaking Assistance: Independent  Ambulation Assistance: Independent  Transfer Assistance: Independent  Active : Yes  Type of occupation:   Leisure & Hobbies: gym  Additional Comments: No recent falls     Cognition   Cognition  Overall Cognitive Status: WNL    Objective  AROM RLE (degrees)  RLE AROM: WFL  AROM LLE (degrees)  LLE AROM : WFL  Strength RLE  Strength RLE: WFL  Comment: grossly 5/5  Strength LLE  Strength LLE: WFL  Comment: hip grossly 4+/5; knee and ankle grossly 5/5  Tone RLE  RLE Tone: Normotonic  Tone LLE  LLE Tone: Normotonic  Motor Control  Gross Motor?: WFL  Coordination  Rapid Alternating Movements: Normal  Finger to Nose: Normal  Heel to Shin: Normal  Sensation  Overall Sensation Status: WFL(Pt reporting intermittent tingling in LLE however intact to touch upon testing)  Bed mobility  Supine to Sit: Independent  Scooting: Independent  Transfers  Sit to Stand: Independent  Stand to sit: Independent  Ambulation  Ambulation?: Yes  More Ambulation?: Yes  Ambulation 1  Surface: level tile  Device: No Device  Assistance: Independent  Quality of Gait: appropriate shadia, slightly narrow Skip  Distance: 200'  Comments: Pt able to negotiate obstacles without assist. No LOB. Pt reporting dizziness with heading turning/nodding. Pt wore mask in garrido. Stairs/Curb  Stairs?: Yes  Stairs  # Steps : 12  Stairs Height: 6\"  Rails: None  Device: No Device  Assistance: Independent  Comment: Pt able to negotiate with reciprocal pattern, no LOB. Balance  Posture: Good  Sitting - Static: Good  Sitting - Dynamic: Good  Standing - Static: Good  Standing - Dynamic: Good        Plan   Plan  Times per week: discharge  Safety Devices  Type of devices: (pt left seated in transport chair leaving unit with transport for ECHO)  Restraints  Initially in place: No    G-Code       OutComes Score       AM-PAC Score  AM-PAC Inpatient Mobility Raw Score : 24 (07/13/20 0937)  AM-PAC Inpatient T-Scale Score : 61.14 (07/13/20 0937)  Mobility Inpatient CMS 0-100% Score: 0 (07/13/20 0937)  Mobility Inpatient CMS G-Code Modifier : [facility] (07/13/20 5533)          Goals  Short term goals  Time Frame for Short term goals: none, discharge from PT caseload       Therapy Time   Individual Concurrent Group Co-treatment   Time In 0906         Time Out 0925         Minutes 19              Timed Code Treatment Minutes:   0    Total Treatment Minutes:  19  Time spent with pt shared with OT as pt is under observation status. As such, pt is being billed 0 units for evaluation.     [address] [place] [address], [address] [place] [address]

## 2020-07-13 NOTE — PROGRESS NOTES
MRI questionnaire completed and faxed to MRI department. VSS, no change in neuro status. Denies needs, lights out for sleep.

## 2020-07-14 VITALS
DIASTOLIC BLOOD PRESSURE: 73 MMHG | WEIGHT: 181.1 LBS | SYSTOLIC BLOOD PRESSURE: 121 MMHG | RESPIRATION RATE: 16 BRPM | BODY MASS INDEX: 26.82 KG/M2 | TEMPERATURE: 97.9 F | HEART RATE: 80 BPM | OXYGEN SATURATION: 97 % | HEIGHT: 69 IN

## 2020-07-14 LAB
EKG ATRIAL RATE: 79 BPM
EKG DIAGNOSIS: NORMAL
EKG P AXIS: 50 DEGREES
EKG P-R INTERVAL: 188 MS
EKG Q-T INTERVAL: 366 MS
EKG QRS DURATION: 82 MS
EKG QTC CALCULATION (BAZETT): 419 MS
EKG R AXIS: 27 DEGREES
EKG T AXIS: 13 DEGREES
EKG VENTRICULAR RATE: 79 BPM

## 2020-07-14 PROCEDURE — 93010 ELECTROCARDIOGRAM REPORT: CPT | Performed by: INTERNAL MEDICINE

## 2020-07-14 PROCEDURE — G0378 HOSPITAL OBSERVATION PER HR: HCPCS

## 2020-07-14 ASSESSMENT — PAIN SCALES - GENERAL: PAINLEVEL_OUTOF10: 0

## 2020-07-14 NOTE — PROGRESS NOTES
Data- discharge order received, pt verbalized agreement to discharge, disposition to previous residence, no needs for HHC/DME. Action- discharge instructions prepared and given to pt, pt verbalized understanding. Medication information packet given r/t NEW and/or CHANGED prescriptions emphasizing name/purpose/side effects, pt verbalized understanding. Discharge instruction summary: Diet- General, Activity- as tolerated, Primary Care Physician as follows: List provided of PCP physicians in the area. f/u appointment with PCP to be scheduled for 1-2 weeks, no prescription medications. OTC Ibuprofen prn. Response- Pt belongings gathered, IV removed. Disposition is home (no HHC/DME needs), transported with papers, taken to ED lobby escorted by RN, no complications. Driving himself home.

## 2020-07-14 NOTE — DISCHARGE SUMMARY
[facility]    Patient Demographics    Patient. Faisal Mcbride  Date of Birth. 1980  MRN. [id number]     Primary care provider. No primary care provider on file. (Tel: None)    Admit date: 7/11/2020    Discharge date (blank if same as Note Date): Note Date: 7/14/2020     Reason for Hospitalization. Chief Complaint   Patient presents with    Extremity Weakness     Pt to er with c/o numbness to left arm adn left leg for a week, states went to urgent care today for eval. pt denies injury to arm or leg, pt able to walk and move extremities with no difficulty. Significant Findings. Principal Problem:    Left-sided weakness  Active Problems:    Headache    Left sided numbness    TIA involving right internal carotid artery  Resolved Problems:    * No resolved hospital problems. *       Problems and results from this hospitalization that need follow up. 1. Probable migraine variant with vasospasm. Significant test results and incidental findings. CXR 7/11/2020:  FINDINGS: Single portable frontal view of the chest is submitted for review.  The cardiac silhouette is normal in size.  Lung parenchyma is clear without focal airspace consolidation, sizeable pleural effusion, or pneumothorax.  Trachea is midline.  Visualized osseous structures and soft tissues are grossly intact.      CT Head 7/11/2020:  FINDINGS: BRAIN/VENTRICLES: There is no acute intracranial hemorrhage, mass effect or midline shift.  No abnormal extra-axial fluid collection.  The gray-white differentiation is maintained without evidence of an acute infarct.  There is no evidence of hydrocephalus.  ORBITS: The visualized portion of the orbits demonstrate no acute abnormality.  SINUSES: The visualized paranasal sinuses and mastoid air cells demonstrate no acute abnormality.  SOFT TISSUES/SKULL:  No acute abnormality of the visualized skull or soft tissues. MRI Brain/MRA Head/Neck 7/13/2020:  No acute intracranial abnormality.  No mass effect or abnormal intracranial    enhancement.         Unremarkable MRA of the neck.         Unremarkable MRA of the head. Venous doppler 7/13/2020:  Conclusions          Summary          No evidence of deep vein or superficial vein thrombosis involving the left     lower extremity and the right common femoral vein. Echo 7/13/2020:  Summary   -Normal left ventricle size, wall thickness and systolic function with an   estimated ejection fraction of 55%.   -No regional wall motion abnormalities are seen. -E/e\"= 7.6 .   -Normal diastolic function.   -There appears to be chordal CUCO with no gradients. This would be of no   clinical consequence   -A bubble study was performed and fails to show evidence of shunting.   -Trivial tricuspid regurgitation. Invasive procedures and treatments. 1. None     Problem-based Hospital Course. Patient is a 37 yo male with no significant PMH. He presented to hospital with 1 week hx of intermittent HA, left sided paresthesia and weakness. Workup including imaging studies were non-revealing, no evidence of acute stroke. Patient was evaluated by neurology who suspects this was probably a migraine variant with vasospasm. Upon DC, patient was symptom free. Consults. IP CONSULT TO NEUROLOGY    Physical examination on discharge day. /85   Pulse 81   Temp 97.3 °F (36.3 °C) (Oral)   Resp 16   Ht 5' 9\" (1.753 m)   Wt 186 lb 1.6 oz (84.4 kg)   SpO2 98%   BMI 27.48 kg/m²   General appearance. Alert. Looks comfortable. HEENT. Sclera clear. Moist mucus membranes. Cardiovascular. Regular rate and rhythm, normal S1, S2. No murmur. Respiratory. Not using accessory muscles. Clear to auscultation bilaterally, no wheeze. Gastrointestinal. Abdomen soft, non-tender, not distended, normal bowel sounds  Neurology. Facial symmetry.  No speech deficits. Moving all extremities equally. Extremities. No edema in lower extremities. Skin. Warm, dry, normal turgor    Condition at time of discharge stable    Medication instructions provided to patient at discharge. Medication List      ASK your doctor about these medications    ibuprofen 200 MG tablet  Commonly known as:  ADVIL;MOTRIN          Provided note to return to work Wednesday 7/15 without restriction. Discharge recommendations given to patient. Follow Up. PCP in 1 week   Disposition. home  Activity. As tolerated  Diet: DIET CARDIAC; No Added Salt (3-4 GM)      Spent 35 minutes in discharge process.     Signed:  CORNELIO Lawson CNP     7/14/2020 10:23 AM

## 2021-10-31 ENCOUNTER — HOSPITAL ENCOUNTER (EMERGENCY)
Age: 41
Discharge: HOME | End: 2021-11-01
Payer: COMMERCIAL

## 2021-10-31 VITALS
HEART RATE: 83 BPM | SYSTOLIC BLOOD PRESSURE: 128 MMHG | TEMPERATURE: 97.7 F | WEIGHT: 181 LBS | DIASTOLIC BLOOD PRESSURE: 92 MMHG | BODY MASS INDEX: 26.73 KG/M2 | RESPIRATION RATE: 16 BRPM | OXYGEN SATURATION: 99 %
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
## 2021-10-31 PROCEDURE — 99283 EMERGENCY DEPT VISIT LOW MDM: CPT

## 2021-10-31 RX ORDER — AMOXICILLIN AND CLAVULANATE POTASSIUM 875; 125 MG/1; MG/1
1 TABLET, FILM COATED ORAL ONCE
Status: COMPLETED | OUTPATIENT
Start: 2021-10-31 | End: 2021-11-01

## 2021-10-31 RX ORDER — AMOXICILLIN AND CLAVULANATE POTASSIUM 875; 125 MG/1; MG/1
1 TABLET, FILM COATED ORAL 2 TIMES DAILY
Qty: 20 TABLET | Refills: 0 | Status: SHIPPED | OUTPATIENT
Start: 2021-10-31 | End: 2021-11-10

## 2021-10-31 RX ORDER — AMOXICILLIN AND CLAVULANATE POTASSIUM 875; 125 MG/1; MG/1
1 TABLET, FILM COATED ORAL 2 TIMES DAILY
Qty: 20 TABLET | Refills: 0 | Status: SHIPPED | OUTPATIENT
Start: 2021-10-31 | End: 2021-10-31 | Stop reason: SDUPTHER

## 2021-10-31 ASSESSMENT — PAIN SCALES - GENERAL: PAINLEVEL_OUTOF10: 8

## 2021-11-01 PROCEDURE — 90471 IMMUNIZATION ADMIN: CPT | Performed by: PHYSICIAN ASSISTANT

## 2021-11-01 PROCEDURE — 90715 TDAP VACCINE 7 YRS/> IM: CPT | Performed by: PHYSICIAN ASSISTANT

## 2021-11-01 PROCEDURE — 6360000002 HC RX W HCPCS: Performed by: PHYSICIAN ASSISTANT

## 2021-11-01 PROCEDURE — 6370000000 HC RX 637 (ALT 250 FOR IP): Performed by: PHYSICIAN ASSISTANT

## 2021-11-01 RX ADMIN — TETANUS TOXOID, REDUCED DIPHTHERIA TOXOID AND ACELLULAR PERTUSSIS VACCINE, ADSORBED 0.5 ML: 5; 2.5; 8; 8; 2.5 SUSPENSION INTRAMUSCULAR at 00:09

## 2021-11-01 RX ADMIN — AMOXICILLIN AND CLAVULANATE POTASSIUM 1 TABLET: 875; 125 TABLET, FILM COATED ORAL at 00:10

## 2021-11-01 ASSESSMENT — ENCOUNTER SYMPTOMS
VOMITING: 0
NAUSEA: 0

## 2021-11-01 NOTE — ED PROVIDER NOTES
[facility]        Pt Name: Thea Judd  MRN: [id number]  [place] 1980  Date of evaluation: 10/31/2021  Provider: Mario Mtz PA-C  PCP: No primary care provider on file. Note Started: 12:48 AM EDT       LAVERNE. I have evaluated this patient. My supervising physician was available for consultation. 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 pt with abrasions to bilateral knees and scratches on right hand. denies getting bit by dogs. HISTORY OF PRESENT ILLNESS   (Location, Timing/Onset, Context/Setting, Quality, Duration, Modifying Factors, Severity, Associated Signs and Symptoms)  Note limiting factors. Chief Complaint: Dog scratch    Thea Judd is a 42 y.o. male who presents to the emergency department today for evaluation for a dog scratch. The patient states that he was delivering acid through door-, and he states that when me client open the door, he states that the 2 dogs ran outside, knocked him down and did scratch him. He states that he did not get bit by any of the dogs. He states he does have several abrasions noted. The patient is reporting pain to the abrasions only which he is rating is an 8/10, the patient otherwise denies any known alleviating or any factors. The patient states that again he was not bit by any of the dog's. Patient is unsure of his last tetanus. He has no fever chills. No nausea or vomiting. No numbness, tilling or weakness. No other complaints. Nursing Notes were all reviewed and agreed with or any disagreements were addressed in the HPI. REVIEW OF SYSTEMS    (2-9 systems for level 4, 10 or more for level 5)     Review of Systems   Constitutional: Negative for activity change, appetite change and fever. Gastrointestinal: Negative for nausea and vomiting. Musculoskeletal: Negative for arthralgias. Skin: Positive for wound. Neurological: Negative for weakness and numbness. Positives and Pertinent negatives as per HPI. Except as noted above in the ROS, all other systems were reviewed and negative. PAST MEDICAL HISTORY   History reviewed. No pertinent past medical history. SURGICAL HISTORY     Past Surgical History:   Procedure Laterality Date    WISDOM TOOTH EXTRACTION Right 2016         [address]       Discharge Medication List as of 11/1/2021 12:05 AM      CONTINUE these medications which have NOT CHANGED    Details   ibuprofen (ADVIL;MOTRIN) 200 MG tablet Take 400 mg by mouth every 6 hours as needed for PainHistorical Med               ALLERGIES     Patient has no known allergies. FAMILYHISTORY       Family History   Problem Relation Age of Onset    High Blood Pressure Brother     High Blood Pressure Paternal Aunt     High Blood Pressure Paternal Cousin     Stroke Paternal Cousin           SOCIAL HISTORY       Social History     Tobacco Use    Smoking status: Never Smoker    Smokeless tobacco: Never Used   Vaping Use    Vaping Use: Never used   Substance Use Topics    Alcohol use: Never    Drug use: Never       SCREENINGS             PHYSICAL EXAM    (up to 7 for level 4, 8 or more for level 5)     ED Triage Vitals [10/31/21 2253]   BP Temp Temp Source Pulse Resp SpO2 Height Weight   (!) 128/92 97.7 °F (36.5 °C) Oral 83 16 99 % -- 181 lb (82.1 kg)       Physical Exam  Vitals and nursing note reviewed. Constitutional:       Appearance: He is well-developed. He is not diaphoretic. HENT:      Head: Normocephalic and atraumatic. Right Ear: External ear normal.      Left Ear: External ear normal.      Nose: Nose normal.   Eyes:      General:         Right eye: No discharge. Left eye: No discharge. Neck:      Trachea: No tracheal deviation. Cardiovascular:      Pulses: Normal pulses.    Pulmonary: Effort: Pulmonary effort is normal. No respiratory distress. Musculoskeletal:         General: Normal range of motion. Cervical back: Normal range of motion and neck supple. Comments: Scattered abrasions noted to bilateral dorsum of hands, and right knee. There is no bony tenderness. Intact distal pulses. No lacerations. Normal sensation light touch. Neurovascularly intact   Skin:     General: Skin is warm and dry. Neurological:      Mental Status: He is alert and oriented to person, place, and time. Psychiatric:         Behavior: Behavior normal.         DIAGNOSTIC RESULTS   LABS:    Labs Reviewed - No data to display    When ordered only abnormal lab results are displayed. All other labs were within normal range or not returned as of this dictation. EKG: When ordered, EKG's are interpreted by the Emergency Department Physician in the absence of a cardiologist.  Please see their note for interpretation of EKG. RADIOLOGY:   Non-plain film images such as CT, Ultrasound and MRI are read by the radiologist. Plain radiographic images are visualized and preliminarily interpreted by the ED Provider with the below findings:        Interpretation per the Radiologist below, if available at the time of this note:    No orders to display     No results found.         PROCEDURES   Unless otherwise noted below, none     Procedures    CRITICAL CARE TIME   N/A    CONSULTS:  None      EMERGENCY DEPARTMENT COURSE and DIFFERENTIAL DIAGNOSIS/MDM:   Vitals:    Vitals:    10/31/21 2253   BP: (!) 128/92   Pulse: 83   Resp: 16   Temp: 97.7 °F (36.5 °C)   TempSrc: Oral   SpO2: 99%   Weight: 181 lb (82.1 kg)       Patient was given the following medications:  Medications   Tetanus-Diphth-Acell Pertussis (BOOSTRIX) injection 0.5 mL (0.5 mLs IntraMUSCular Given 11/1/21 0009)   amoxicillin-clavulanate (AUGMENTIN) 875-125 MG per tablet 1 tablet (1 tablet Oral Given 11/1/21 0010)           Monica [place], this is a 48-year-old male who presents emergency department today for evaluation for a dog scratch. 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 He states he was scratched by the dogs. He denies being bit. On examination he does have scattered abrasions noted, no lacerations. Tetanus will be updated in the ED and as this is a dog scratch will cover with antibiotics. He was given his first dose in the ED and will be given Augmentin for home. He is to obtain follow-up with his primary care physician within 2 to 3 days for reevaluation. He is to return to the ED for any new or worsening symptoms. Patient was understanding is agreeable with plan. Stable for discharge. No results found for this visit on 10/31/21. I estimate there is LOW risk for COMPARTMENT SYNDROME, DEEP VENOUS THROMBOSIS, SEPTIC ARTHRITIS, TENDON OR NEUROVASCULAR INJURY, thus I consider the discharge disposition reasonable. Karen Gutierrez and I have discussed the diagnosis and risks, and we agree with discharging home to follow-up with their primary doctor or the referral orthopedist. We also discussed returning to the Emergency Department immediately if new or worsening symptoms occur. We have discussed the symptoms which are most concerning (e.g., changing or worsening pain, numbness, weakness) that necessitate immediate return. FINAL IMPRESSION      1.  Dog scratch          DISPOSITION/PLAN   DISPOSITION Decision To Discharge 11/01/2021 12:19:00 AM      PATIENT REFERRED TO:  Your primary care physician    Schedule an appointment as soon as possible for a visit in 2 days      [facility] Emergency Department  [address] [place] [address]  [phone]    As needed, If symptoms worsen      DISCHARGE MEDICATIONS:  Discharge Medication List as of 11/1/2021 12:05 AM          DISCONTINUED MEDICATIONS:  Discharge Medication List as of 11/1/2021 12:05 AM                 (Please note that portions of this note were completed with a voice recognition program.  Efforts were made to edit the dictations but occasionally words are mis-transcribed.)    Joe Zapien PA-C (electronically signed)            Joe Zapien PA-C  11/01/21 [facility]  11/01/21 5570